# Patient Record
Sex: MALE | Race: BLACK OR AFRICAN AMERICAN | Employment: FULL TIME | URBAN - METROPOLITAN AREA
[De-identification: names, ages, dates, MRNs, and addresses within clinical notes are randomized per-mention and may not be internally consistent; named-entity substitution may affect disease eponyms.]

---

## 2024-08-06 ENCOUNTER — HOSPITAL ENCOUNTER (EMERGENCY)
Age: 19
Discharge: HOME | End: 2024-08-06
Attending: EMERGENCY MEDICINE

## 2024-08-06 VITALS
WEIGHT: 115.4 LBS | HEIGHT: 69 IN | BODY MASS INDEX: 17.09 KG/M2 | DIASTOLIC BLOOD PRESSURE: 86 MMHG | OXYGEN SATURATION: 100 % | RESPIRATION RATE: 16 BRPM | SYSTOLIC BLOOD PRESSURE: 98 MMHG | HEART RATE: 83 BPM | TEMPERATURE: 97.8 F

## 2024-08-06 DIAGNOSIS — R11.2 NAUSEA AND VOMITING, UNSPECIFIED VOMITING TYPE: Primary | ICD-10-CM

## 2024-08-06 PROCEDURE — 99283 EMERGENCY DEPT VISIT LOW MDM: CPT

## 2024-08-06 RX ORDER — ONDANSETRON 4 MG/1
4 TABLET, ORALLY DISINTEGRATING ORAL 3 TIMES DAILY PRN
Qty: 10 TABLET | Refills: 0 | Status: SHIPPED | OUTPATIENT
Start: 2024-08-06

## 2024-08-06 ASSESSMENT — LIFESTYLE VARIABLES
HOW MANY STANDARD DRINKS CONTAINING ALCOHOL DO YOU HAVE ON A TYPICAL DAY: 1 OR 2
HOW OFTEN DO YOU HAVE A DRINK CONTAINING ALCOHOL: 2-4 TIMES A MONTH

## 2024-08-06 ASSESSMENT — PAIN - FUNCTIONAL ASSESSMENT: PAIN_FUNCTIONAL_ASSESSMENT: 0-10

## 2024-08-06 ASSESSMENT — PAIN SCALES - GENERAL: PAINLEVEL_OUTOF10: 0

## 2024-08-06 NOTE — ED NOTES
I have reviewed discharge instructions with the patient.  The patient verbalized understanding.    Patient left ED via Discharge Method: ambulatory to Home with self.    Opportunity for questions and clarification provided.       Patient given 1 scripts.         To continue your aftercare when you leave the hospital, you may receive an automated call from our care team to check in on how you are doing.  This is a free service and part of our promise to provide the best care and service to meet your aftercare needs.” If you have questions, or wish to unsubscribe from this service please call [phone].  Thank you for Choosing our [facility] Emergency Department.        Yas Powell LPN  08/06/24 2624

## 2024-08-06 NOTE — ED TRIAGE NOTES
Pt reports woke up feeling sick with 2 episodes of emesis that has improved but pt's boss requesting pt gets evaluated.   (-)cough, congestion    A&Ox4

## 2024-08-06 NOTE — ED PROVIDER NOTES
Emergency Department Provider Note       PCP: None, None   Age: 19 y.o.   Sex: male     DISPOSITION Decision To Discharge 08/06/2024 09:52:34 AM       ICD-10-CM    1. Nausea and vomiting, unspecified vomiting type  R11.2           Medical Decision Making     19-year-old presents with nausea vomiting this morning.  Works in the  industry.  Will be discharged home on Zofran and given a note for work today.  If he feels fine tomorrow he can go back to work.     1 acute, uncomplicated illness or injury.  Prescription drug management performed.    I independently ordered and reviewed each unique test.                     History     19-year-old AA male presents to the emerged part complaining of nausea vomiting starting this morning.  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  He denies any fever or chills, change in bowel habits, melena or hematochezia.  Denies any abdominal pain.    The history is provided by the patient.     Physical Exam     Vitals signs and nursing note reviewed:  Vitals:    08/06/24 0952   BP: 98/86   Pulse: 83   Resp: 16   Temp: 97.8 °F (36.6 °C)   TempSrc: Oral   SpO2: 100%   Weight: 52.3 kg (115 lb 6.4 oz)   Height: 1.75 m (5' 8.9\")      Physical Exam  Vitals and nursing note reviewed.   Constitutional:       General: He is not in acute distress.  HENT:      Head: Normocephalic and atraumatic.      Right Ear: External ear normal.      Left Ear: External ear normal.      Nose: Nose normal.      Mouth/Throat:      Mouth: Mucous membranes are moist.   Eyes:      Extraocular Movements: Extraocular movements intact.      Conjunctiva/sclera: Conjunctivae normal.      Pupils: Pupils are equal, round, and reactive to light.   Cardiovascular:      Rate and Rhythm: Normal rate and regular rhythm.      Heart sounds: No murmur heard.  Pulmonary:      Effort: Pulmonary